# Patient Record
Sex: FEMALE | Race: WHITE
[De-identification: names, ages, dates, MRNs, and addresses within clinical notes are randomized per-mention and may not be internally consistent; named-entity substitution may affect disease eponyms.]

---

## 2023-03-06 ENCOUNTER — HOSPITAL ENCOUNTER (EMERGENCY)
Dept: HOSPITAL 54 - ER | Age: 33
Discharge: HOME | End: 2023-03-06
Payer: SELF-PAY

## 2023-03-06 VITALS — HEIGHT: 64 IN | BODY MASS INDEX: 22.2 KG/M2 | WEIGHT: 130 LBS

## 2023-03-06 VITALS — SYSTOLIC BLOOD PRESSURE: 107 MMHG | DIASTOLIC BLOOD PRESSURE: 81 MMHG

## 2023-03-06 DIAGNOSIS — F17.200: ICD-10-CM

## 2023-03-06 DIAGNOSIS — R06.02: Primary | ICD-10-CM

## 2023-03-06 DIAGNOSIS — T40.411A: ICD-10-CM
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
## 2023-03-06 DIAGNOSIS — Z79.899: ICD-10-CM

## 2023-03-06 NOTE — NUR
ZENY 58 FROM HOME FOR OD ON FENTANYL. MOM GAVE AN SPRAY OF NARCAN PRIOR TO EMS 
ARRIVAL. PATIENT REC'D ZOFRAN IV PTA. A, OX4 AND VERBALLY RESPONSIVE . DENIED 
SI. PATIENT WAS PLACED IN BED 12 ER ON MONITOR , VSS.

## 2023-03-06 NOTE — NUR
MEDICALLY STABLE FOR D/C. IV removed. Catheter intact and site benign. Pressure 
and 4x4 applied to site. No bleeding noted.Patient discharged to home in stable 
condition. Written and verbal after care instructions given. Patient verbalizes 
understanding of instruction.